# Patient Record
Sex: FEMALE | Race: WHITE | URBAN - METROPOLITAN AREA
[De-identification: names, ages, dates, MRNs, and addresses within clinical notes are randomized per-mention and may not be internally consistent; named-entity substitution may affect disease eponyms.]

---

## 2017-09-15 ENCOUNTER — TRANSFERRED RECORDS (OUTPATIENT)
Dept: HEALTH INFORMATION MANAGEMENT | Facility: CLINIC | Age: 37
End: 2017-09-15

## 2019-03-18 ENCOUNTER — TRANSFERRED RECORDS (OUTPATIENT)
Dept: HEALTH INFORMATION MANAGEMENT | Facility: CLINIC | Age: 39
End: 2019-03-18

## 2019-03-24 ENCOUNTER — TRANSFERRED RECORDS (OUTPATIENT)
Dept: HEALTH INFORMATION MANAGEMENT | Facility: CLINIC | Age: 39
End: 2019-03-24

## 2019-03-27 ENCOUNTER — TRANSFERRED RECORDS (OUTPATIENT)
Dept: HEALTH INFORMATION MANAGEMENT | Facility: CLINIC | Age: 39
End: 2019-03-27

## 2019-06-04 NOTE — TELEPHONE ENCOUNTER
RECORDS RECEIVED FROM: Bone cyst on left shoulder, appt per pt, referred by Dr. Marc Guan, outside records located at [facility] in [facility], pt had MRI done there and then had surgery on her shoulder in [place].   DATE RECEIVED: Jun 12, 2019    NOTES STATUS DETAILS   OFFICE NOTE from referring provider Received Dr. Guan 9/14/19  Dr. Pickard 9/19/16   OFFICE NOTE from other specialist N/A    DISCHARGE SUMMARY from hospital Received    DISCHARGE REPORT from the ER N/A    OPERATIVE REPORT Received 11/2/16, 11/7/16   MEDICATION LIST Received    IMPLANT RECORD/STICKER N/A    LABS     CBC/DIFF N/A    CULTURES N/A    INJECTIONS DONE IN RADIOLOGY N/A    MRI Received 3/18/19    CT SCAN N/A    XRAYS (IMAGES & REPORTS) Received 3/14/19   TUMOR     PATHOLOGY  Slides & report N/A      06/04/19   4:37 PM  Faxed request to [facility] for imaging, after confirming fax # over the phone.

## 2019-06-12 ENCOUNTER — PRE VISIT (OUTPATIENT)
Dept: ORTHOPEDICS | Facility: CLINIC | Age: 39
End: 2019-06-12

## 2019-06-12 ENCOUNTER — OFFICE VISIT (OUTPATIENT)
Dept: ORTHOPEDICS | Facility: CLINIC | Age: 39
End: 2019-06-12
Payer: COMMERCIAL

## 2019-06-12 VITALS — BODY MASS INDEX: 53.92 KG/M2 | WEIGHT: 293 LBS | HEIGHT: 62 IN

## 2019-06-12 DIAGNOSIS — G89.29 CHRONIC LEFT SHOULDER PAIN: Primary | ICD-10-CM

## 2019-06-12 DIAGNOSIS — M25.512 CHRONIC LEFT SHOULDER PAIN: Primary | ICD-10-CM

## 2019-06-12 RX ORDER — SERTRALINE HYDROCHLORIDE 100 MG/1
100 TABLET, FILM COATED ORAL DAILY
COMMUNITY
Start: 2019-01-01

## 2019-06-12 RX ORDER — ARIPIPRAZOLE 10 MG/1
10 TABLET ORAL AT BEDTIME
COMMUNITY
End: 2019-06-12

## 2019-06-12 RX ORDER — FEXOFENADINE HCL 180 MG/1
180 TABLET ORAL DAILY
COMMUNITY
Start: 2015-01-01 | End: 2019-07-30

## 2019-06-12 RX ORDER — TRAZODONE HYDROCHLORIDE 50 MG/1
50 TABLET, FILM COATED ORAL AT BEDTIME
Refills: 2 | COMMUNITY
Start: 2019-05-06

## 2019-06-12 RX ORDER — CELECOXIB 200 MG/1
200 CAPSULE ORAL PRN
Refills: 3 | COMMUNITY
Start: 2018-10-30

## 2019-06-12 RX ORDER — BUSPIRONE HYDROCHLORIDE 10 MG/1
10 TABLET ORAL AT BEDTIME
Refills: 2 | COMMUNITY
Start: 2019-05-06

## 2019-06-12 RX ORDER — DICLOFENAC SODIUM 75 MG/1
75 TABLET, DELAYED RELEASE ORAL 2 TIMES DAILY
COMMUNITY
Start: 2018-09-20 | End: 2019-06-12

## 2019-06-12 RX ORDER — GABAPENTIN 100 MG/1
100 CAPSULE ORAL DAILY
COMMUNITY
Start: 2019-02-25

## 2019-06-12 RX ORDER — PREDNISONE 20 MG/1
20 TABLET ORAL
Refills: 0 | COMMUNITY
Start: 2019-05-24 | End: 2019-06-12

## 2019-06-12 RX ORDER — ALBUTEROL SULFATE 90 UG/1
2 AEROSOL, METERED RESPIRATORY (INHALATION) PRN
COMMUNITY
Start: 2018-05-30

## 2019-06-12 ASSESSMENT — ENCOUNTER SYMPTOMS
TASTE DISTURBANCE: 0
HEARTBURN: 1
POOR WOUND HEALING: 0
MEMORY LOSS: 0
PARALYSIS: 0
SLEEP DISTURBANCES DUE TO BREATHING: 0
SYNCOPE: 0
POLYPHAGIA: 0
WEIGHT GAIN: 0
HOARSE VOICE: 0
DISTURBANCES IN COORDINATION: 0
BACK PAIN: 1
JOINT SWELLING: 1
ABDOMINAL PAIN: 0
HEADACHES: 1
SINUS PAIN: 1
BOWEL INCONTINENCE: 0
DEPRESSION: 1
WEAKNESS: 1
COUGH DISTURBING SLEEP: 0
JAUNDICE: 0
BLOOD IN STOOL: 0
HYPERTENSION: 0
NERVOUS/ANXIOUS: 1
POSTURAL DYSPNEA: 0
NUMBNESS: 1
FATIGUE: 1
ALTERED TEMPERATURE REGULATION: 0
CONSTIPATION: 0
NAIL CHANGES: 0
MUSCLE CRAMPS: 1
LOSS OF CONSCIOUSNESS: 0
RECTAL PAIN: 0
MUSCLE WEAKNESS: 1
VOMITING: 0
SEIZURES: 0
TINGLING: 1
POLYDIPSIA: 0
CHILLS: 0
INSOMNIA: 1
LIGHT-HEADEDNESS: 0
SHORTNESS OF BREATH: 1
COUGH: 1
WEIGHT LOSS: 1
DIZZINESS: 0
DECREASED CONCENTRATION: 1
TREMORS: 0
HEMOPTYSIS: 0
FEVER: 0
LEG PAIN: 0
DIARRHEA: 1
STIFFNESS: 1
HALLUCINATIONS: 0
BLOATING: 0
ARTHRALGIAS: 1
SINUS CONGESTION: 1
PANIC: 1
NECK PAIN: 1
DECREASED APPETITE: 0
SORE THROAT: 0
SKIN CHANGES: 0
NAUSEA: 0
NIGHT SWEATS: 0
HYPOTENSION: 0
INCREASED ENERGY: 1
WHEEZING: 1
PALPITATIONS: 1
DYSPNEA ON EXERTION: 1
SPUTUM PRODUCTION: 1
SNORES LOUDLY: 1
ORTHOPNEA: 0
TROUBLE SWALLOWING: 0
SPEECH CHANGE: 0
SMELL DISTURBANCE: 0
NECK MASS: 0
MYALGIAS: 1
EXERCISE INTOLERANCE: 1

## 2019-06-12 ASSESSMENT — MIFFLIN-ST. JEOR: SCORE: 2157.81

## 2019-06-12 NOTE — NURSING NOTE
Teaching Flowsheet   Relevant Diagnosis: Left shoulder pain, prior surgery  Teaching Topic: Pre-op for left shoulder diagnostic arthroscopy - surgery coordinator will call to schedule     Person(s) involved in teaching:   Patient     Motivation Level:  Asks Questions: Yes  Cooperative: Yes  Receptive (willing/able to accept information): Yes  Any cultural factors/Scientologist beliefs that may influence understanding or compliance? No     Patient demonstrates understanding of the following:  Reason for the appointment, diagnosis and treatment plan: Yes  Knowledge of proper use of medications and conditions for which they are ordered (with special attention to potential side effects or drug interactions): Yes  Which situations necessitate calling provider and whom to contact: Yes     Teaching Concerns Addressed:   Pre-op physical by primary provider at [facility] with her daughter who will provide transportation and assistance with cares after surgery  Aware of post-op expectations     Proper use and care of sling prn (medical equip, care aids, etc.): Yes  Nutritional needs and diet plan: Yes  Pain management techniques: Yes  Wound Care: Yes  How and/when to access community resources: Yes     Instructional Materials Used/Given: Pre-op packet, surgical soap     Time spent with patient: 12.
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
## 2019-06-12 NOTE — LETTER
6/12/2019       RE: Brandy Dubose  [address]  [place] [address]     Dear Colleague,    Thank you for referring your patient, Brandy Dubose, to the [facility] at [facility]. Please see a copy of my visit note below.    I was present with the resident during the history and exam.  I discussed the case with the resident and agree with the findings as documented in the assessment and plan.    [facility]  ORTHOPAEDIC SURGERY CONSULT - HISTORY AND PHYSICAL    DATE OF CONSULT: 6/12/2019 12:22 PM    CC: L shoulder pain     DATE OF INJURY: ~ 6 years ago     HISTORY OF PRESENT ILLNESS:   Brandy Dubose is a 38 year old with PMH including L shoulder surgery -repair, subscap repair, biceps tenotomy in 2016.  Since that time she has not had significant improvement in her shoulder symptoms.  For this reason she had an MRI scan which shows a lesion within the humeral head.  She was referred here for further evaluation.  Describes that she has good range of motion and strength of her shoulder, however she has a deep aching pain in the left shoulder at all times.  She has gone through multiple rounds of injections, physical therapy without much improvement. States that she has loud popping on occasion with motion.    PAST MEDICAL HISTORY:   No past medical history on file.    Patient denies any personal history of bleeding disorders, clotting disorders, or adverse reactions to anesthesia.    PAST SURGICAL HISTORY:   No past surgical history on file.      MEDICATIONS:   Prior to Admission medications    Medication Sig Last Dose Taking? Auth Provider   albuterol (VENTOLIN HFA) 108 (90 Base) MCG/ACT inhaler Take 2 puffs by mouth as needed Taking Yes Reported, Patient   ARIPiprazole (ABILIFY) 10 MG tablet Take 10 mg by mouth At Bedtime Taking Yes Reported, Patient   busPIRone (BUSPAR) 10 MG tablet Take 10 mg by mouth At Bedtime Taking Yes  "Reported, Patient   celecoxib (CELEBREX) 200 MG capsule Take 200 mg by mouth as needed Taking Yes Reported, Patient   diclofenac (VOLTAREN) 75 MG EC tablet Take 75 mg by mouth 2 times daily Taking Yes Reported, Patient   fexofenadine (ALLEGRA) 180 MG tablet Take 180 mg by mouth daily Taking Yes Reported, Patient   gabapentin (NEURONTIN) 100 MG capsule Take 100 mg by mouth daily Taking Yes Reported, Patient   HEMP OIL OR EXTRACT OR OTHER CBD CANNABINOID, NOT MEDICAL CANNABIS,  Taking Yes Reported, Patient   predniSONE (DELTASONE) 20 MG tablet Take 20 mg by mouth Taking Yes Reported, Patient   sertraline (ZOLOFT) 100 MG tablet Take 100 mg by mouth daily Taking Yes Reported, Patient   traZODone (DESYREL) 50 MG tablet Take 50 mg by mouth At Bedtime Taking Yes Reported, Patient       ALLERGIES:   Fish allergy    REVIEW OF SYSTEMS:   Otherwise, a 10-point reviews of systems was negative except as noted above in the HPI.     PHYSICAL EXAM:   Vitals:    06/12/19 1158   Weight: (!) 151.5 kg (333 lb 14.4 oz)   Height: 1.575 m (5' 2\")     General: Awake, alert, appropriate, following commands, NAD.  Neuro: CN II-XII grossly intact.   Psych: Appropriate affect.   Skin: No rashes,  skin color normal.  HEENT: Normal.   Lungs: Breathing comfortably and nonlabored, no wheezes or stridor noted.  Heart/Cardiovascular: Regular pulse, no peripheral cyanosis.    Left Upper Extremity: Able to demonstrate full overhead range of motion. Intact function of her AIN, PIN, interossei. Fingers are WWP.    LABS:  No results found for: HGB  No results found for: WBC  No results found for: PLT  No results found for: INR  No results found for: CR  No results found for: GLC    IMAGING:  Well-circumscribed lesion within the anterior aspect of the left humeral head most consistent with previous abberant anchor placement for subscapularis repair. Previous biceps tenotomy.     ASSESSMENT AND PLAN:   Brandy Dubose is a 38 year old with left " shoulder pain 3 years after a left arthroscopic labral repair, subscapularis repair and biceps tenotomy.  MRI scan is most consistent with apparent placement of subscapularis anchor. Pt will discuss further with Dr. Medina regarding further management.     Seen and discussed with Dr. Gary Bonilla MD   Orthopaedic Surgery PGY-4  Pager: [phone]    Eh Vega MD

## 2019-06-12 NOTE — NURSING NOTE
"Reason For Visit:   Chief Complaint   Patient presents with     Left Shoulder - Pain     Pain     Pt complains of left shoulder pain for the past 6 years. Has been told there is a cyst on the shoulder.       Ht 1.575 m (5' 2\")   Wt (!) 151.5 kg (333 lb 14.4 oz)   BMI 61.07 kg/m      Pain Assessment  Patient Currently in Pain: Yes  Patient's Stated Pain Goal: No pain  0-10 Pain Scale: 5  Primary Pain Location: Shoulder  Pain Descriptors: Aching  Alleviating Factors: Rest    David Abdi, EMT    "

## 2019-06-12 NOTE — PROGRESS NOTES
[facility]  ORTHOPAEDIC SURGERY CONSULT - HISTORY AND PHYSICAL    DATE OF CONSULT: 6/12/2019 12:22 PM    CC: L shoulder pain     DATE OF INJURY: ~ 6 years ago     HISTORY OF PRESENT ILLNESS:   Brandy Dubose is a 38 year old with PMH including L shoulder surgery -repair, subscap repair, biceps tenotomy in 2016.  Since that time she has not had significant improvement in her shoulder symptoms.  For this reason she had an MRI scan which shows a lesion within the humeral head.  She was referred here for further evaluation.  Describes that she has good range of motion and strength of her shoulder, however she has a deep aching pain in the left shoulder at all times.  She has gone through multiple rounds of injections, physical therapy without much improvement. States that she has loud popping on occasion with motion.    PAST MEDICAL HISTORY:   No past medical history on file.    Patient denies any personal history of bleeding disorders, clotting disorders, or adverse reactions to anesthesia.    PAST SURGICAL HISTORY:   No past surgical history on file.      MEDICATIONS:   Prior to Admission medications    Medication Sig Last Dose Taking? Auth Provider   albuterol (VENTOLIN HFA) 108 (90 Base) MCG/ACT inhaler Take 2 puffs by mouth as needed Taking Yes Reported, Patient   ARIPiprazole (ABILIFY) 10 MG tablet Take 10 mg by mouth At Bedtime Taking Yes Reported, Patient   busPIRone (BUSPAR) 10 MG tablet Take 10 mg by mouth At Bedtime Taking Yes Reported, Patient   celecoxib (CELEBREX) 200 MG capsule Take 200 mg by mouth as needed Taking Yes Reported, Patient   diclofenac (VOLTAREN) 75 MG EC tablet Take 75 mg by mouth 2 times daily Taking Yes Reported, Patient   fexofenadine (ALLEGRA) 180 MG tablet Take 180 mg by mouth daily Taking Yes Reported, Patient   gabapentin (NEURONTIN) 100 MG capsule Take 100 mg by mouth daily Taking Yes Reported, Patient   HEMP OIL OR EXTRACT OR OTHER CBD CANNABINOID, NOT MEDICAL  "CANNABIS,  Taking Yes Reported, Patient   predniSONE (DELTASONE) 20 MG tablet Take 20 mg by mouth Taking Yes Reported, Patient   sertraline (ZOLOFT) 100 MG tablet Take 100 mg by mouth daily Taking Yes Reported, Patient   traZODone (DESYREL) 50 MG tablet Take 50 mg by mouth At Bedtime Taking Yes Reported, Patient       ALLERGIES:   Fish allergy    REVIEW OF SYSTEMS:   Otherwise, a 10-point reviews of systems was negative except as noted above in the HPI.     PHYSICAL EXAM:   Vitals:    06/12/19 1158   Weight: (!) 151.5 kg (333 lb 14.4 oz)   Height: 1.575 m (5' 2\")     General: Awake, alert, appropriate, following commands, NAD.  Neuro: CN II-XII grossly intact.   Psych: Appropriate affect.   Skin: No rashes,  skin color normal.  HEENT: Normal.   Lungs: Breathing comfortably and nonlabored, no wheezes or stridor noted.  Heart/Cardiovascular: Regular pulse, no peripheral cyanosis.    Left Upper Extremity: Able to demonstrate full overhead range of motion. Intact function of her AIN, PIN, interossei. Fingers are WWP.      LABS:  No results found for: HGB  No results found for: WBC  No results found for: PLT  No results found for: INR  No results found for: CR  No results found for: GLC    IMAGING:  Well-circumscribed lesion within the anterior aspect of the left humeral head most consistent with previous abberant anchor placement for subscapularis repair. Previous biceps tenotomy.     ASSESSMENT AND PLAN:   Brandy Dubose is a 38 year old with left shoulder pain 3 years after a left arthroscopic labral repair, subscapularis repair and biceps tenotomy.  MRI scan is most consistent with apparent placement of subscapularis anchor. Pt will discuss further with Dr. Medina regarding further management.     Seen and discussed with Dr. Gary Bonilla MD   Orthopaedic Surgery PGY-4  Pager: [phone]    Answers for HPI/ROS submitted by the patient on 6/12/2019   General Symptoms: Yes  Skin Symptoms: Yes  HENT " Symptoms: Yes  EYE SYMPTOMS: No  HEART SYMPTOMS: Yes  LUNG SYMPTOMS: Yes  INTESTINAL SYMPTOMS: Yes  URINARY SYMPTOMS: No  GYNECOLOGIC SYMPTOMS: No  BREAST SYMPTOMS: No  SKELETAL SYMPTOMS: Yes  BLOOD SYMPTOMS: No  NERVOUS SYSTEM SYMPTOMS: Yes  MENTAL HEALTH SYMPTOMS: Yes  Fever: No  Loss of appetite: No  Weight loss: Yes  Weight gain: No  Fatigue: Yes  Night sweats: No  Chills: No  Increased stress: Yes  Excessive hunger: No  Excessive thirst: No  Feeling hot or cold when others believe the temperature is normal: No  Loss of height: No  Post-operative complications: No  Surgical site pain: No  Hallucinations: No  Change in or Loss of Energy: Yes  Hyperactivity: No  Confusion: No  Changes in hair: No  Changes in moles/birth marks: No  Itching: Yes  Rashes: Yes  Changes in nails: No  Acne: Yes  Hair in places you don't want it: No  Change in facial hair: No  Warts: No  Non-healing sores: No  Scarring: No  Flaking of skin: No  Color changes of hands/feet in cold : No  Sun sensitivity: No  Skin thickening: No  Ear pain: No  Ear discharge: No  Hearing loss: No  Tinnitus: Yes  Nosebleeds: No  Congestion: Yes  Sinus pain: Yes  Trouble swallowing: No   Voice hoarseness: No  Mouth sores: No  Sore throat: No  Tooth pain: No  Gum tenderness: No  Bleeding gums: No  Change in taste: No  Change in sense of smell: No  Dry mouth: No  Hearing aid used: No  Neck lump: No  Cough: Yes  Sputum or phlegm: Yes  Coughing up blood: No  Difficulty breating or shortness of breath: Yes  Snoring: Yes  Wheezing: Yes  Difficulty breathing on exertion: Yes  Nighttime Cough: No  Difficulty breathing when lying flat: No  Chest pain or pressure: No  Fast or irregular heartbeat: Yes  Pain in legs with walking: No  Trouble breathing while lying down: No  Fingers or toes appear blue: No  High blood pressure: No  Low blood pressure: No  Fainting: No  Murmurs: No  Pacemaker: No  Varicose veins: No  Edema or swelling: Yes  Wake up at night with shortness of  breath: No  Light-headedness: No  Exercise intolerance: Yes  Heart burn or indigestion: Yes  Nausea: No  Vomiting: No  Abdominal pain: No  Bloating: No  Constipation: No  Diarrhea: Yes  Blood in stool: No  Black stools: No  Rectal or Anal pain: No  Fecal incontinence: No  Yellowing of skin or eyes: No  Vomit with blood: No  Change in stools: Yes  Back pain: Yes  Muscle aches: Yes  Neck pain: Yes  Swollen joints: Yes  Joint pain: Yes  Bone pain: Yes  Muscle cramps: Yes  Muscle weakness: Yes  Joint stiffness: Yes  Bone fracture: No  Trouble with coordination: No  Dizziness or trouble with balance: No  Fainting or black-out spells: No  Memory loss: No  Headache: Yes  Seizures: No  Speech problems: No  Tingling: Yes  Tremor: No  Weakness: Yes  Difficulty walking: Yes  Paralysis: No  Numbness: Yes  Nervous or Anxious: Yes  Depression: Yes  Trouble sleeping: Yes  Trouble thinking or concentrating: Yes  Mood changes: Yes  Panic attacks: Yes

## 2019-06-13 ENCOUNTER — TELEPHONE (OUTPATIENT)
Dept: ORTHOPEDICS | Facility: CLINIC | Age: 39
End: 2019-06-13

## 2019-06-13 NOTE — TELEPHONE ENCOUNTER
Attempted to reach out to patient to assist with scheduling surgery with Dr. Medina. Left detailed message informing patient that first available will be Tuesday July 16th. Left best call back number of [phone]

## 2019-06-19 ENCOUNTER — HOSPITAL ENCOUNTER (OUTPATIENT)
Facility: CLINIC | Age: 39
End: 2019-06-19
Attending: ORTHOPAEDIC SURGERY | Admitting: ORTHOPAEDIC SURGERY
Payer: COMMERCIAL
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
## 2019-08-16 RX ORDER — CEFAZOLIN SODIUM IN 0.9 % NACL 3 G/100 ML
3 INTRAVENOUS SOLUTION, PIGGYBACK (ML) INTRAVENOUS
Status: CANCELLED | OUTPATIENT
Start: 2019-08-20

## 2019-08-16 RX ORDER — CEFAZOLIN SODIUM 1 G/3ML
1 INJECTION, POWDER, FOR SOLUTION INTRAMUSCULAR; INTRAVENOUS SEE ADMIN INSTRUCTIONS
Status: CANCELLED | OUTPATIENT
Start: 2019-08-20

## 2019-08-19 ENCOUNTER — ANESTHESIA EVENT (OUTPATIENT)
Dept: SURGERY | Facility: CLINIC | Age: 39
End: 2019-08-19

## 2019-08-19 RX ORDER — CEFAZOLIN SODIUM IN 0.9 % NACL 3 G/100 ML
3 INTRAVENOUS SOLUTION, PIGGYBACK (ML) INTRAVENOUS
Status: CANCELLED | OUTPATIENT
Start: 2019-08-20

## 2019-08-19 RX ORDER — CEFAZOLIN SODIUM 1 G/3ML
1 INJECTION, POWDER, FOR SOLUTION INTRAMUSCULAR; INTRAVENOUS SEE ADMIN INSTRUCTIONS
Status: CANCELLED | OUTPATIENT
Start: 2019-08-20

## 2019-08-20 ENCOUNTER — ANESTHESIA (OUTPATIENT)
Dept: SURGERY | Facility: CLINIC | Age: 39
End: 2019-08-20

## 2020-03-11 ENCOUNTER — HEALTH MAINTENANCE LETTER (OUTPATIENT)
Age: 40
End: 2020-03-11

## 2021-01-03 ENCOUNTER — HEALTH MAINTENANCE LETTER (OUTPATIENT)
Age: 41
End: 2021-01-03

## 2021-04-25 ENCOUNTER — HEALTH MAINTENANCE LETTER (OUTPATIENT)
Age: 41
End: 2021-04-25

## 2021-10-10 ENCOUNTER — HEALTH MAINTENANCE LETTER (OUTPATIENT)
Age: 41
End: 2021-10-10

## 2022-05-21 ENCOUNTER — HEALTH MAINTENANCE LETTER (OUTPATIENT)
Age: 42
End: 2022-05-21

## 2022-09-18 ENCOUNTER — HEALTH MAINTENANCE LETTER (OUTPATIENT)
Age: 42
End: 2022-09-18

## 2023-06-04 ENCOUNTER — HEALTH MAINTENANCE LETTER (OUTPATIENT)
Age: 43
End: 2023-06-04

## 2024-02-25 ENCOUNTER — HEALTH MAINTENANCE LETTER (OUTPATIENT)
Age: 44
End: 2024-02-25